# Patient Record
Sex: FEMALE | Race: WHITE | Employment: OTHER | URBAN - METROPOLITAN AREA
[De-identification: names, ages, dates, MRNs, and addresses within clinical notes are randomized per-mention and may not be internally consistent; named-entity substitution may affect disease eponyms.]

---

## 2017-07-07 PROBLEM — E78.2 MIXED HYPERLIPIDEMIA: Status: ACTIVE | Noted: 2017-07-07

## 2017-07-25 PROBLEM — H91.93 BILATERAL HEARING LOSS, UNSPECIFIED HEARING LOSS TYPE: Status: ACTIVE | Noted: 2017-07-25

## 2021-06-30 ENCOUNTER — LAB ENCOUNTER (OUTPATIENT)
Dept: LAB | Age: 81
End: 2021-06-30
Attending: INTERNAL MEDICINE
Payer: MEDICARE

## 2021-06-30 ENCOUNTER — OFFICE VISIT (OUTPATIENT)
Dept: NEUROLOGY | Facility: CLINIC | Age: 81
End: 2021-06-30
Payer: MEDICARE

## 2021-06-30 VITALS
WEIGHT: 125 LBS | RESPIRATION RATE: 16 BRPM | SYSTOLIC BLOOD PRESSURE: 128 MMHG | BODY MASS INDEX: 24 KG/M2 | HEART RATE: 60 BPM | DIASTOLIC BLOOD PRESSURE: 82 MMHG

## 2021-06-30 DIAGNOSIS — R53.82 CHRONIC FATIGUE: ICD-10-CM

## 2021-06-30 DIAGNOSIS — R42 DIZZINESS: ICD-10-CM

## 2021-06-30 DIAGNOSIS — R42 DIZZINESS: Primary | ICD-10-CM

## 2021-06-30 LAB
CRP SERPL-MCNC: <0.29 MG/DL (ref ?–0.3)
HAV IGM SER QL: 2.2 MG/DL (ref 1.6–2.6)
SED RATE-ML: 12 MM/HR
VIT B12 SERPL-MCNC: 289 PG/ML (ref 193–986)

## 2021-06-30 PROCEDURE — 82607 VITAMIN B-12: CPT

## 2021-06-30 PROCEDURE — 86140 C-REACTIVE PROTEIN: CPT

## 2021-06-30 PROCEDURE — 99203 OFFICE O/P NEW LOW 30 MIN: CPT | Performed by: HOSPITALIST

## 2021-06-30 PROCEDURE — 83735 ASSAY OF MAGNESIUM: CPT

## 2021-06-30 PROCEDURE — 36415 COLL VENOUS BLD VENIPUNCTURE: CPT

## 2021-06-30 PROCEDURE — 85652 RBC SED RATE AUTOMATED: CPT

## 2021-06-30 RX ORDER — RABEPRAZOLE SODIUM 20 MG/1
TABLET, DELAYED RELEASE ORAL NIGHTLY
COMMUNITY
Start: 2015-06-30

## 2021-06-30 RX ORDER — TRIAMCINOLONE ACETONIDE 55 UG/1
SPRAY, METERED NASAL NIGHTLY
COMMUNITY
Start: 2021-06-03

## 2021-06-30 RX ORDER — CETIRIZINE HYDROCHLORIDE 10 MG/1
TABLET ORAL NIGHTLY
COMMUNITY
Start: 2021-04-01

## 2021-06-30 NOTE — PROGRESS NOTES
Neurology H&P    Prosper Cho Patient Status:  No patient class for patient encounter    1940 MRN [id number]   Location [address] [facility] Attending No att. providers found   Hosp Day # 0 PCP Chioma Rousseau MD     Subjective:   Kenneth Fisher • LUMIGAN 0.01 % Ophthalmic Solution 1 drop every evening. 1   • Timolol Maleate (TIMOPTIC-XR) 0.5 % Ophthalmic Gel Forming Solution Place 1 drop into both eyes daily.     4       Problem List:  Patient Active Problem List:     Atopic rhinitis     Thao Calloway negative, except for pertinent positive and negatives stated in subjective. Objective/Physical Exam:    Vital Signs:  Blood pressure 128/82, pulse 60, resp. rate 16, weight 125 lb (56.7 kg), not currently breastfeeding.     Gen: Awake and in no apparent tachycardia response was blunted. However, she denied any symptoms during the study. Plan:  -We will check several labs including B12, magnesium, and inflammatory markers. -If unrevealing, can follow-up as needed.   Discussed her memory issues with estela

## 2021-06-30 NOTE — PROGRESS NOTES
Patient here for evaluation of dizziness. Has been occurring for many years, but worse over the last month.

## 2021-07-02 ENCOUNTER — PATIENT MESSAGE (OUTPATIENT)
Dept: NEUROLOGY | Facility: CLINIC | Age: 81
End: 2021-07-02

## 2021-07-02 NOTE — TELEPHONE ENCOUNTER
From: Ramona Thompson  To: Rosemarie Worthy MD  Sent: 7/2/2021 3:26 PM CDT  Subject: Test Results Question    My mom does not currently take a B12 supplement. Is there a specific brand and dosage recommended? Thank you.   Marco A Brown

## 2021-07-04 ENCOUNTER — PATIENT MESSAGE (OUTPATIENT)
Dept: NEUROLOGY | Facility: CLINIC | Age: 81
End: 2021-07-04

## 2021-07-06 NOTE — TELEPHONE ENCOUNTER
From: Mau Gan  To: Ayo Blanton MD  Sent: 7/4/2021 2:13 PM CDT  Subject: Visit Follow-up Question    Please for all information related to this visit including lab results to   Dr. Jeffery Kim at [address] [place] [address]. Thank you!

## 2021-11-22 ENCOUNTER — TELEPHONE (OUTPATIENT)
Dept: NEUROLOGY | Facility: CLINIC | Age: 81
End: 2021-11-22

## 2021-11-22 NOTE — TELEPHONE ENCOUNTER
pt's daughter states pt was just seen at the [facility]; a test was ordered that [address] was unable to complete and Pt's daughter would like to know if it is something we can do or where it can be done; the test is FDG PET scan; pls call Mirtha Jarvis at [phone]

## 2021-11-22 NOTE — TELEPHONE ENCOUNTER
Pt daughter states they have an order but are unsure if it can be used at THE [facility] since it was for [employer].   Directed to central scheduling with instructions to call back with any questions

## 2021-11-30 ENCOUNTER — HOSPITAL ENCOUNTER (OUTPATIENT)
Dept: NUCLEAR MEDICINE | Facility: HOSPITAL | Age: 81
Discharge: HOME | End: 2021-11-30
Payer: MEDICARE

## 2021-11-30 DIAGNOSIS — F03.90 MAJOR NEUROCOGNITIVE DISORDER (HCC): ICD-10-CM

## 2021-11-30 PROCEDURE — 78608 BRAIN IMAGING (PET): CPT

## 2021-11-30 PROCEDURE — 78608 BRAIN IMAGING (PET): CPT | Performed by: OTHER

## 2021-11-30 PROCEDURE — 82962 GLUCOSE BLOOD TEST: CPT

## 2022-01-18 ENCOUNTER — OFFICE VISIT (OUTPATIENT)
Dept: NEUROLOGY | Facility: CLINIC | Age: 82
End: 2022-01-18
Payer: MEDICARE

## 2022-01-18 VITALS
BODY MASS INDEX: 24 KG/M2 | RESPIRATION RATE: 17 BRPM | WEIGHT: 129 LBS | SYSTOLIC BLOOD PRESSURE: 148 MMHG | HEART RATE: 78 BPM | DIASTOLIC BLOOD PRESSURE: 70 MMHG

## 2022-01-18 DIAGNOSIS — R41.3 MEMORY LOSS: Primary | ICD-10-CM

## 2022-01-18 PROCEDURE — 99213 OFFICE O/P EST LOW 20 MIN: CPT | Performed by: OTHER

## 2022-01-18 RX ORDER — ESCITALOPRAM OXALATE 10 MG/1
10 TABLET ORAL DAILY
COMMUNITY

## 2022-01-18 NOTE — PATIENT INSTRUCTIONS
After your visit at the [facility] office today,  please direct any follow up questions or medication needs to the staff in our [place] office so that your concerns may be promptly addressed.   We are available through [facility] or at the numbers below: picked up in office. • Please allow the office 2-3 business days to fill the prescription. • Patient must present photo ID at time of . PLEASE NOTE: PRESCRIPTIONS MUST BE PICKED UP PRIOR TO 3:00PM MONDAY-FRIDAY    Scheduling Tests:     If your ph submitting forms to office staff. • Form completion may require an additional fee. • A signed Release of Information (SANDY) must be on file before forms may be submitted. When dropping off forms, please ask the  for this paper.    • Failure

## 2022-01-18 NOTE — PROGRESS NOTES
Patient's daughter, Prashant Hayes, accompanies patient to visit. Patient and daughter report that memory loss has remained the same. Patient's daughter reports that memory changes were noticed approximately 2018.

## 2022-01-18 NOTE — PROGRESS NOTES
Neurology H&P    Mac Fung Patient Status:  No patient class for patient encounter    1940 MRN [id number]   Location [address]  Attending No att. providers found   Hosp Day # 0 PCP Clarisa Beckford Hematuria     Hypercholesterolemia     Hyperglycemia     Hyperkalemia     HTN (hypertension), benign     Osteopenia     Simple renal cyst     Screen for colon cancer     Mixed hyperlipidemia     Bilateral hearing loss, unspecified hearing loss type      PM STATUS: alert, ox3, normal attention, language and fund of knowledge.       CRANIAL NERVES II to XII: PERRLA, no ptosis or diplopia, EOM intact, facial sensation intact, strong eye closure, face is symmetric, no dysarthria, tongue midline,  no tongue fascic

## 2022-07-19 ENCOUNTER — OFFICE VISIT (OUTPATIENT)
Dept: NEUROLOGY | Facility: CLINIC | Age: 82
End: 2022-07-19
Payer: MEDICARE

## 2022-07-19 VITALS
BODY MASS INDEX: 24.92 KG/M2 | HEART RATE: 67 BPM | RESPIRATION RATE: 16 BRPM | SYSTOLIC BLOOD PRESSURE: 128 MMHG | WEIGHT: 132 LBS | HEIGHT: 61 IN | DIASTOLIC BLOOD PRESSURE: 66 MMHG

## 2022-07-19 DIAGNOSIS — R41.3 MEMORY LOSS: Primary | ICD-10-CM

## 2022-07-19 DIAGNOSIS — R26.89 BALANCE PROBLEM: ICD-10-CM

## 2022-07-19 PROCEDURE — 99213 OFFICE O/P EST LOW 20 MIN: CPT | Performed by: OTHER

## 2022-07-19 RX ORDER — MELATONIN
1000 DAILY
COMMUNITY

## 2023-07-15 ENCOUNTER — PATIENT MESSAGE (OUTPATIENT)
Dept: NEUROLOGY | Facility: CLINIC | Age: 83
End: 2023-07-15

## 2023-07-17 NOTE — TELEPHONE ENCOUNTER
From: Andrzej Modi  To: Ty Tsai DO  Sent: 7/15/2023 8:14 PM CDT  Subject: Upcoming visit 7-18-23    Hi Dr. Mari Pierre,  I will be bringing my mom Shalonda Dixon to see you for her follow up appt with you on 7-18-23. My brothers and I have some questions and thought we should provide them ahead of time. It is difficult to ask questions during the visit without creating an argument.   -what do we tell mom when she constantly complains of dizziness and foggy brain?  -should we try one of the medications to help her short term memory?   -she has had instances of standing up and feeling like she may faint or feels woozy (her words)  -she always feels off balance and drunk like. -Complaints of dizziness increase with any change in routines.   -She complains of neck pain daily.   -she no longer drives.   -she lives alone and refuses anyone be hired to check in on her.    Thank you  Celso Nila daughter

## 2023-07-18 ENCOUNTER — OFFICE VISIT (OUTPATIENT)
Dept: NEUROLOGY | Facility: CLINIC | Age: 83
End: 2023-07-18
Payer: MEDICARE

## 2023-07-18 VITALS — SYSTOLIC BLOOD PRESSURE: 131 MMHG | DIASTOLIC BLOOD PRESSURE: 68 MMHG | HEART RATE: 64 BPM

## 2023-07-18 DIAGNOSIS — R41.3 MEMORY LOSS: Primary | ICD-10-CM

## 2023-07-18 PROCEDURE — 99213 OFFICE O/P EST LOW 20 MIN: CPT | Performed by: OTHER

## 2023-07-18 RX ORDER — LORAZEPAM 0.5 MG/1
TABLET ORAL
COMMUNITY
Start: 2023-01-04

## 2023-07-18 RX ORDER — DONEPEZIL HYDROCHLORIDE 5 MG/1
5 TABLET, FILM COATED ORAL NIGHTLY
Qty: 30 TABLET | Refills: 3 | Status: SHIPPED | OUTPATIENT
Start: 2023-07-18

## 2023-07-18 RX ORDER — ROSUVASTATIN CALCIUM 5 MG/1
TABLET, COATED ORAL
COMMUNITY
Start: 2022-07-11

## 2023-07-18 NOTE — PROGRESS NOTES
Neurology H&P    Juanita Howell Patient Status:  No patient class for patient encounter    1940 MRN [id number]   Location [place], [place]  Attending No att. providers found   Hosp Day # 0 PCP Eugene Echavarria MD     Subjective:  Initial Clinic HPI 22  Juanita Howell is a(n) 81year old female with a PMH significant for HL HTN and who comes to the neurology clinic for memory loss. To date no specific cause (e.g. dementia) has been found to explain this. She has had MRI brain and a PET scan that showed mild global volume loss but no abnormal uptake or suggestion of dementia (Alzheimers etc). She has been seen at the [facility] clinic for this (they rcomended Lexapro) and also by my colleague Dr. Trena Fernandez. She and her daughter state that they are just here to establish care. Per pt and her daughter she has been ok since seeing the [facility] clinic Her memory seems a bit improved since increasing Lexapro. She has had intermitent dizziness for at least 6 years. This is stable. She is not driving. Interim History:  Pt was last seen in the clinic on 22. She comes back to the clinic with her daughter today. At her last visit we discussed chronic reports of dizziness/lightheadedness and memory loss. She has had extensive work ups for these conditions in the past including at [facility] with no clear suggestion of any underlying etiology. She is not driving. She lives alone. She cooks and has not left the stove on. Per daughter she cannot really follow a recipe. Her children mostly handle her finances. Current Medications:  Current Outpatient Medications   Medication Sig Dispense Refill    rosuvastatin 5 MG Oral Tab       cyanocobalamine 1000 MCG Oral Tab Take 1 tablet (1,000 mcg total) by mouth daily. CARVEDILOL 12.5 MG Oral Tab TAKE 1 TABLET (12.5 MG TOTAL) BY MOUTH 2 (TWO) TIMES DAILY.  STOP METOPROLOL 180 tablet 3    escitalopram 10 MG Oral Tab Take 1 tablet (10 mg total) by mouth daily. cetirizine 10 MG Oral Tab nightly. Triamcinolone Acetonide 55 MCG/ACT Nasal Aerosol nightly. RABEprazole Sodium 20 MG Oral Tab EC nightly. Calcium Citrate-Vitamin D (CITRACAL MAXIMUM OR) Take 650 mg by mouth daily. LUMIGAN 0.01 % Ophthalmic Solution 1 drop every evening. 1    Timolol Maleate (TIMOPTIC-XR) 0.5 % Ophthalmic Gel Forming Solution Place 1 drop into both eyes daily. 4    LORazepam 0.5 MG Oral Tab 1/2 tablet bid prn anxiety/dizzy (Patient not taking: Reported on 7/18/2023)         Problem List:  Patient Active Problem List:     Atopic rhinitis     Wolf's esophagus     Malignant neoplasm of breast ([facility] [address] 2002     Neck pain     Diaphragmatic paralysis     Gastroesophageal reflux disease     Hematuria     Hypercholesterolemia     Hyperglycemia     Hyperkalemia     HTN (hypertension), benign     Osteopenia     Simple renal cyst     Screen for colon cancer     Mixed hyperlipidemia     Bilateral hearing loss, unspecified hearing loss type     Memory loss     Balance problem      PMHx:  Past Medical History:   Diagnosis Date    Anxiety     Wolf's esophagus 11/22/2011    Diaphragmatic paralysis 2/15/2012    Eustachian tube dysfunction     Gastroesophageal reflux disease 11/9/2011    Hypercholesterolemia 11/9/2011    Hypertension 11/9/2011    Malignant neoplasm of breast [facility]) 2002 11/9/2011    Description: 2002 stage 1 also had radiation in remission     Osteopenia 11/9/2011    Simple renal cyst 9/24/2012       PSHx:  Past Surgical History:   Procedure Laterality Date    BREAST SURGERY      Lumpectomy     CHOLECYSTECTOMY      CORTISONE INJECTION Right     shoulder    HYSTERECTOMY      OTHER SURGICAL HISTORY      Esophagogastroduodenoscopy       SocHx:  Social History     Socioeconomic History    Marital status:     Tobacco Use    Smoking status: Never    Smokeless tobacco: Never   Vaping Use    Vaping Use: Never used   Substance and Sexual Activity    Alcohol use: Yes     Alcohol/week: 7.0 standard drinks of alcohol     Types: 7 Standard drinks or equivalent per week     Comment: wine nightly    Drug use: No   Other Topics Concern    Caffeine Concern Yes     Comment: 1-2 cups coffee per day    Exercise No     Comment: walking in yard       Family History:  Family History   Problem Relation Age of Onset    Other (Other) Father         Chronic bronchitis     Dementia Mother     Glaucoma Mother     Cancer Sister         Breast and lung     Other (Other) Sister         osteoporosis             ROS:  10 point ROS completed and was negative, except for pertinent positive and negatives stated in subjective. Objective/Physical Exam:    Vital Signs:  not currently breastfeeding. Gen: Awake and in no apparent distress  HEENT: moist mucus membranes  Neck: Supple  Cardiovascular: Regular rate and rhythm, no murmur  Pulm: CTAB  GI: non-tender, normal bowel sounds  Skin: normal, dry  Extremities: No clubbing or cyanosis      Neurologic:   MENTAL STATUS: oriented to person but not year or month, able to tell me how many quarters in a dollar and in three dollars, serial 7's to jeferson digit. She refuses to take a [address] [place] and refuses to take part in MS testing. CRANIAL NERVES II to XII: PERRLA, no ptosis or diplopia, EOM intact, facial sensation intact, strong eye closure, face is symmetric, no dysarthria, tongue midline,  no tongue fasciculations or atrophy, strong shoulder shrug.     MOTOR EXAMINATION: normal tone, no fasciculations, normal strength throughout in UEs and LEs      SENSORY EXAMINATION:  UE: intact to light touch, pinprick intact  LE: intact to light touch, pinprick intact    COORDINATION:  No dysmetria, or intention tremors     REFLEXES: 2+ at biceps, 2+ brachioradialis, 2+ at patella, 2+ at the ankles     GAIT: normal stance, normal gait, normal toe and heel walk, unsteady tandem             Labs:       Imaging:  FTD PET 11/30/21  FINDINGS: There is symmetric activity throughout the brain. No abnormal FDG uptake is identified.                   =====  CONCLUSION:  No abnormal pattern of FDG activity. Assessment: This is an 82 y/o female with short term memory loss and intermittent dizziness. Extensive work up has been done and she has been seen at the [facility] for these issues as well. She again declines any referral to PT for her balance. I did discuss donepezil and after some convincing she agrees to try thi/ She likely has some mild dementia. She is unable to answer most questions that I ask her during her memory testing today. She eventually declines to answer any questions. Plan:  1. Memory loss - May have a mild dementia  - Maybe MCI. PET not consistent with Alzheimer's, MRI brain and [facility] and previous neurology charts reviewed  - Donepezil 5mg PO Qday  - Declines any referral to neuropsychology for a formal cognitive evaluation  - Declines any further work up for memory loss    2.  Iong intermittent dizziness  - Extensive work up for this long standing problems  - Declined any PT today        Cheron Showers, DO  Neurology

## 2023-07-18 NOTE — PROGRESS NOTES
Pt's daughter reports that pt has \"some days better than others', memory decline since last visit. Pt reports dizziness is the same and has not changed.

## 2023-09-12 ENCOUNTER — PATIENT MESSAGE (OUTPATIENT)
Dept: NEUROLOGY | Facility: CLINIC | Age: 83
End: 2023-09-12